# Patient Record
Sex: MALE | Race: ASIAN | NOT HISPANIC OR LATINO | URBAN - METROPOLITAN AREA
[De-identification: names, ages, dates, MRNs, and addresses within clinical notes are randomized per-mention and may not be internally consistent; named-entity substitution may affect disease eponyms.]

---

## 2023-12-04 ENCOUNTER — EMERGENCY (EMERGENCY)
Facility: HOSPITAL | Age: 18
LOS: 0 days | Discharge: HOME | End: 2023-12-04
Attending: STUDENT IN AN ORGANIZED HEALTH CARE EDUCATION/TRAINING PROGRAM
Payer: MEDICAID

## 2023-12-04 VITALS
HEART RATE: 64 BPM | TEMPERATURE: 98 F | OXYGEN SATURATION: 97 % | RESPIRATION RATE: 16 BRPM | DIASTOLIC BLOOD PRESSURE: 75 MMHG | SYSTOLIC BLOOD PRESSURE: 121 MMHG

## 2023-12-04 VITALS
HEART RATE: 60 BPM | RESPIRATION RATE: 17 BRPM | TEMPERATURE: 98 F | HEIGHT: 72 IN | OXYGEN SATURATION: 96 % | DIASTOLIC BLOOD PRESSURE: 77 MMHG | WEIGHT: 195.11 LBS | SYSTOLIC BLOOD PRESSURE: 114 MMHG
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
## 2023-12-04 DIAGNOSIS — M79.644 PAIN IN RIGHT FINGER(S): ICD-10-CM

## 2023-12-04 DIAGNOSIS — S60.011A CONTUSION OF RIGHT THUMB WITHOUT DAMAGE TO NAIL, INITIAL ENCOUNTER: ICD-10-CM
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
## 2023-12-04 PROCEDURE — 73130 X-RAY EXAM OF HAND: CPT | Mod: 26,RT

## 2023-12-04 PROCEDURE — 11740 EVACUATION SUBUNGUAL HMTMA: CPT | Mod: F5

## 2023-12-04 PROCEDURE — 99284 EMERGENCY DEPT VISIT MOD MDM: CPT | Mod: 25

## 2023-12-04 RX ORDER — IBUPROFEN 200 MG
600 TABLET ORAL ONCE
Refills: 0 | Status: COMPLETED | OUTPATIENT
Start: 2023-12-04 | End: 2023-12-04

## 2023-12-04 RX ORDER — IBUPROFEN 200 MG
1 TABLET ORAL
Qty: 12 | Refills: 0
Start: 2023-12-04 | End: 2023-12-06

## 2023-12-04 RX ADMIN — Medication 600 MILLIGRAM(S): at 04:46

## 2023-12-04 NOTE — ED PROVIDER NOTE - NSFOLLOWUPINSTRUCTIONS_ED_ALL_ED_FT
A subungual hematoma is a collection of blood between the nail and your finger or toe.  • Subungual hematomas are commonly caused by an injury to the tip of the finger or the nail.  • The pain and pressure are relieved quickly by making a hole on the nail to release the blood. This  is called trephination.  • Trephination is not required if you do not have any pain.  • Avoid soaking the finger or toe and keep it dry for 48 hours, then clean with soap and warm  water.  • Keep the digit elevated above the level of the heart to prevent pain and throbbing.  • The nail may fall off in the days after the blood has been drained.  • Watch for signs and symptoms of infection (pus, fever, increased pain and redness, red streaks  near the wound, increased swelling) and return to the clinic or seek care from another health  care provider.

## 2023-12-04 NOTE — ED ADULT NURSE NOTE - OBJECTIVE STATEMENT
Pt A&Ox4. Pt ambulatory with steady gait. Pt c/o right thumb injury, Pt slammed car door on thumb. Pt took Tylenol 500 mg at 12:30 am. No pmhx. NKDA. Pt states the pain wasn't as bad a few hours ago but now the pain is really bad. Pt denies numbness or tingling, able to move finger at this time. No bleeding, wound or laceration to finger, some bruising noted to nail bed.

## 2023-12-04 NOTE — ED ADULT NURSE NOTE - CHIEF COMPLAINT QUOTE
Pt c/o right thumb injury, got stuck in the car door. Pt took Tylenol 500 mg at 12:30 am. No pmhx. NKDA

## 2023-12-04 NOTE — ED ADULT NURSE NOTE - NSFALLUNIVINTERV_ED_ALL_ED
Bed/Stretcher in lowest position, wheels locked, appropriate side rails in place/Call bell, personal items and telephone in reach/Instruct patient to call for assistance before getting out of bed/chair/stretcher/Non-slip footwear applied when patient is off stretcher/[place] to call system/Physically safe environment - no spills, clutter or unnecessary equipment/Purposeful proactive rounding/Room/bathroom lighting operational, light cord in reach Bed/Stretcher in lowest position, wheels locked, appropriate side rails in place/Call bell, personal items and telephone in reach/Instruct patient to call for assistance before getting out of bed/chair/stretcher/Non-slip footwear applied when patient is off stretcher/[place] to call system/Physically safe environment - no spills, clutter or unnecessary equipment/Purposeful proactive rounding/Room/bathroom lighting operational, light cord in reach

## 2023-12-04 NOTE — ED PROVIDER NOTE - CLINICAL SUMMARY MEDICAL DECISION MAKING FREE TEXT BOX
17 y/o M presenting to the ED with subungual hematoma to R thumb.  VItals stable.  XR WNL.  Able to drain scant blood at bedside.  Will discharge. 19 y/o M presenting to the ED with subungual hematoma to R thumb.  VItals stable.  XR WNL.  Able to drain scant blood at bedside.  Will discharge.

## 2023-12-04 NOTE — ED ADULT TRIAGE NOTE - CHIEF COMPLAINT QUOTE
Pt c/o right thumb injury in the car door. Pt took Tylenol 500 mg at 12:30 am. No pmhx. NKDA Pt c/o right thumb injury, got stuck in the car door. Pt took Tylenol 500 mg at 12:30 am. No pmhx. NKDA

## 2023-12-04 NOTE — ED ADULT NURSE NOTE - NSICDXNOPASTMEDICALHX_GEN_ALL_CORE
Pt called and wanted to speak to a supervisor concerning his visit in the walk in. States no one called him back from the note I put in and hes been calling every week since 4/18/22 and no one returns a call. I see he has only called the one time that is documented from me. I see Lonnie Paris commented on the response.   If some one could please advise thanks
Pt came to walk in and was seen started taking the amoxicillin and is complaining of loose bowels almost every 2hrs and is having some bleeding stating he is tender from the loose bowels. Wants to know if it is due to the amoxicillin?  Please advise
<-- Click to add NO pertinent Past Medical History

## 2023-12-04 NOTE — ED PROVIDER NOTE - OBJECTIVE STATEMENT
17 y/o M presenting to the ED c/o pain to the R thumb. Patient slammed his finger in a car door around 10pm. Reports he was having difficulty sleeping so came to the ED. No numbness or tingling.

## 2023-12-04 NOTE — ED PROVIDER NOTE - PHYSICAL EXAMINATION
GENERAL: Awake, alert, NAD  HEENT: NC/AT, moist mucous membranes  LUNGS: CTAB, no wheezes or crackles   CARDIAC: RRR, no m/r/g  EXT: R thumb with full ROM, +subungual hematoma to distal 1/3 of the fingernail  NEURO: A&Ox3. Moving all extremities.  SKIN: Warm and dry. No rash.  PSYCH: Normal affect.

## 2023-12-04 NOTE — ED PROVIDER NOTE - PATIENT PORTAL LINK FT
You can access the FollowMyHealth Patient Portal offered by [facility] by registering at the following website: [email or web address]. By joining [employer]’s FollowMyHealth portal, you will also be able to view your health information using other applications (apps) compatible with our system. You can access the FollowMyHealth Patient Portal offered by [facility] by registering at the following website: [email or web address]. By joining [employer]’s FollowMyHealth portal, you will also be able to view your health information using other applications (apps) compatible with our system.